# Patient Record
Sex: MALE | Race: WHITE | NOT HISPANIC OR LATINO | Employment: FULL TIME | URBAN - METROPOLITAN AREA
[De-identification: names, ages, dates, MRNs, and addresses within clinical notes are randomized per-mention and may not be internally consistent; named-entity substitution may affect disease eponyms.]

---

## 2020-04-21 ENCOUNTER — TELEMEDICINE (OUTPATIENT)
Dept: CARDIOLOGY | Facility: CLINIC | Age: 50
End: 2020-04-21

## 2020-04-21 VITALS — BODY MASS INDEX: 25.41 KG/M2 | WEIGHT: 198 LBS | HEIGHT: 74 IN

## 2020-04-21 DIAGNOSIS — I48.0 AF (PAROXYSMAL ATRIAL FIBRILLATION) (HCC): Primary | ICD-10-CM

## 2020-04-21 PROCEDURE — 99204 OFFICE O/P NEW MOD 45 MIN: CPT | Performed by: INTERNAL MEDICINE

## 2020-04-21 RX ORDER — METOPROLOL SUCCINATE 25 MG/1
TABLET, EXTENDED RELEASE ORAL
COMMUNITY
Start: 2020-03-05

## 2020-04-21 RX ORDER — APIXABAN 5 MG/1
TABLET, FILM COATED ORAL
COMMUNITY
Start: 2020-04-07

## 2020-04-21 NOTE — PROGRESS NOTES
Date of Office Visit: 2020  Encounter Provider: Luis A Garcia MD  Place of Service: [facility] CARDIOLOGY  Patient Name: Dale Bai  : 1970    Subjective:     Encounter Date:2020      This patient has consented to a telehealth visit via video. The visit was scheduled as a tele visit to comply with patient safety concerns in accordance with CDC recommendations.  All vitals recorded within this visit are reported by the patient.  I spent  45 minutes in total including but not limited to the 22 minutes spent in direct conversation with this patient.         Patient ID: Dale Bai is a 49 y.o. male who has a cc of  PAF who self referred.     He has an ablation planned at [facility] this summer.     His symptoms include presyncope and palp and decreased power.     His on apixaban and metoprolol.     He saw Aidan in Nov-- coarse AF and he thinks metoprolol helps.     He can ride his bike but not tried anything hard. If he tries anything after he feels worse.     In normal life he feels ok.     He feels his heart rate and it feels irregular all the time.         There have been no hospital admission since the last visit.     There have been no bleeding events.       Past Medical History:   Diagnosis Date   • Atrial fibrillation (CMS/HCC)    • Atrial flutter (CMS/HCC)    • Fatigue    • Palpitations        Social History     Socioeconomic History   • Marital status:      Spouse name: Not on file   • Number of children: Not on file   • Years of education: Not on file   • Highest education level: Not on file   Tobacco Use   • Smoking status: Never Smoker   • Smokeless tobacco: Never Used   Substance and Sexual Activity   • Alcohol use: Yes       Review of Systems   Constitution: Negative for fever and night sweats.   HENT: Negative for ear pain and stridor.    Eyes: Negative for discharge and visual halos.   Cardiovascular: Negative for cyanosis.   Respiratory:  "Negative for hemoptysis and sputum production.    Hematologic/Lymphatic: Negative for adenopathy.   Skin: Negative for nail changes and unusual hair distribution.   Musculoskeletal: Negative for gout and joint swelling.   Gastrointestinal: Negative for bowel incontinence and flatus.   Genitourinary: Negative for dysuria and flank pain.   Neurological: Negative for seizures and tremors.   Psychiatric/Behavioral: Negative for altered mental status. The patient is not nervous/anxious.             Objective:     Vitals:    04/21/20 1338   Weight: 89.8 kg (198 lb)   Height: 188 cm (74\")     Virtual     Lab Review:       Assessment:          Diagnosis Plan   1. 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 Bermudez's approach.     He is not going to well with meds.     I spend a good 45-min going over all aspects of AF.     He had many questions           "